# Patient Record
Sex: MALE
[De-identification: names, ages, dates, MRNs, and addresses within clinical notes are randomized per-mention and may not be internally consistent; named-entity substitution may affect disease eponyms.]

---

## 2018-06-11 ENCOUNTER — HOSPITAL ENCOUNTER (OUTPATIENT)
Dept: HOSPITAL 93 - RAD 501 | Age: 78
Discharge: HOME | End: 2018-06-11
Attending: UROLOGY
Payer: COMMERCIAL

## 2018-06-11 DIAGNOSIS — N20.1: Primary | ICD-10-CM

## 2018-06-11 DIAGNOSIS — R06.02: ICD-10-CM

## 2018-06-22 ENCOUNTER — HOSPITAL ENCOUNTER (INPATIENT)
Dept: HOSPITAL 93 - O/R | Age: 78
LOS: 5 days | Discharge: HOME | DRG: 714 | End: 2018-06-27
Attending: UROLOGY | Admitting: UROLOGY
Payer: COMMERCIAL

## 2018-06-22 VITALS — WEIGHT: 210 LBS | BODY MASS INDEX: 32.96 KG/M2 | HEIGHT: 67 IN

## 2018-06-22 DIAGNOSIS — N21.0: ICD-10-CM

## 2018-06-22 DIAGNOSIS — N40.1: Primary | ICD-10-CM

## 2018-06-25 PROCEDURE — 0TFB4ZZ FRAGMENTATION IN BLADDER, PERCUTANEOUS ENDOSCOPIC APPROACH: ICD-10-PCS | Performed by: UROLOGY

## 2018-06-25 PROCEDURE — 0VT08ZZ RESECTION OF PROSTATE, VIA NATURAL OR ARTIFICIAL OPENING ENDOSCOPIC: ICD-10-PCS | Performed by: UROLOGY

## 2018-09-18 ENCOUNTER — HOSPITAL ENCOUNTER (EMERGENCY)
Dept: HOSPITAL 93 - ER | Age: 78
Discharge: HOME | End: 2018-09-18
Payer: COMMERCIAL

## 2018-09-18 VITALS — HEIGHT: 67 IN | BODY MASS INDEX: 33.74 KG/M2 | WEIGHT: 215 LBS

## 2018-09-18 DIAGNOSIS — R06.02: Primary | ICD-10-CM

## 2018-09-18 DIAGNOSIS — F41.8: ICD-10-CM

## 2019-11-15 ENCOUNTER — HOSPITAL ENCOUNTER (OUTPATIENT)
Dept: HOSPITAL 93 - LAB | Age: 79
Discharge: HOME | End: 2019-11-15
Attending: UROLOGY
Payer: COMMERCIAL

## 2019-11-15 ENCOUNTER — HOSPITAL ENCOUNTER (OUTPATIENT)
Dept: HOSPITAL 93 - TOM | Age: 79
Discharge: HOME | End: 2019-11-15
Attending: UROLOGY
Payer: COMMERCIAL

## 2019-11-15 DIAGNOSIS — N21.0: ICD-10-CM

## 2019-11-15 DIAGNOSIS — R31.0: ICD-10-CM

## 2019-11-15 DIAGNOSIS — R97.21: Primary | ICD-10-CM

## 2019-11-15 DIAGNOSIS — R31.0: Primary | ICD-10-CM

## 2019-11-15 DIAGNOSIS — N20.1: ICD-10-CM

## 2019-11-15 DIAGNOSIS — N40.1: ICD-10-CM

## 2019-11-21 ENCOUNTER — HOSPITAL ENCOUNTER (INPATIENT)
Dept: HOSPITAL 93 - ER | Age: 79
LOS: 7 days | Discharge: HOME | DRG: 660 | End: 2019-11-28
Attending: INTERNAL MEDICINE | Admitting: INTERNAL MEDICINE
Payer: COMMERCIAL

## 2019-11-21 VITALS — WEIGHT: 215 LBS | BODY MASS INDEX: 33.74 KG/M2 | HEIGHT: 67 IN

## 2019-11-21 DIAGNOSIS — N21.0: ICD-10-CM

## 2019-11-21 DIAGNOSIS — I48.0: ICD-10-CM

## 2019-11-21 DIAGNOSIS — I07.1: ICD-10-CM

## 2019-11-21 DIAGNOSIS — R31.0: ICD-10-CM

## 2019-11-21 DIAGNOSIS — J32.0: ICD-10-CM

## 2019-11-21 DIAGNOSIS — J22: ICD-10-CM

## 2019-11-21 DIAGNOSIS — K57.30: ICD-10-CM

## 2019-11-21 DIAGNOSIS — N13.2: Primary | ICD-10-CM

## 2019-11-21 DIAGNOSIS — I31.3: ICD-10-CM

## 2019-11-21 DIAGNOSIS — N40.1: ICD-10-CM

## 2019-11-21 DIAGNOSIS — N39.0: ICD-10-CM

## 2019-11-21 DIAGNOSIS — N17.8: ICD-10-CM

## 2019-11-21 PROCEDURE — 3E0F7GC INTRODUCTION OF OTHER THERAPEUTIC SUBSTANCE INTO RESPIRATORY TRACT, VIA NATURAL OR ARTIFICIAL OPENING: ICD-10-PCS | Performed by: INTERNAL MEDICINE

## 2019-11-21 PROCEDURE — B246ZZZ ULTRASONOGRAPHY OF RIGHT AND LEFT HEART: ICD-10-PCS | Performed by: INTERNAL MEDICINE

## 2019-11-21 NOTE — NUR
PACIENTE ALERTA Y ORIENTADO POR GISELLA ESFERAS.  ORIENTA A PACIENTE
SOBRE PROCEDIMIENTO Y TX, REFIERE ENTENDER. EXTRAE MUESTRAS DE LABORATORIO CON
MEDIDAS ASEPTICAS Y COLOCA IVF'S BERT ORDEN MEDICA. PENDIENTE RESULTADOS DE
LABORATRIO PARA EVALUACION MEDICA.

## 2019-11-21 NOTE — NUR
SE RECIBE PTE MASCULINO, ALERTA Y ORIENTADO X3, REFIERE DOLOR EN TODO EL
CUERPO, COSTADO IZQ, ESPALDA BAJA Y HEMATURIA, TOS DESDE HACE DOS DAMARI. PTE
LLEGA AER POR ORDEN DE DR. RICKEY MURRIETA Y DR. WHELAN.

## 2019-11-27 PROCEDURE — 0TF78ZZ FRAGMENTATION IN LEFT URETER, VIA NATURAL OR ARTIFICIAL OPENING ENDOSCOPIC: ICD-10-PCS | Performed by: UROLOGY

## 2019-11-27 PROCEDURE — 0T778DZ DILATION OF LEFT URETER WITH INTRALUMINAL DEVICE, VIA NATURAL OR ARTIFICIAL OPENING ENDOSCOPIC: ICD-10-PCS | Performed by: UROLOGY

## 2019-11-27 PROCEDURE — BT1FZZZ FLUOROSCOPY OF LEFT KIDNEY, URETER AND BLADDER: ICD-10-PCS | Performed by: UROLOGY

## 2019-11-27 PROCEDURE — 0TF48ZZ FRAGMENTATION IN LEFT KIDNEY PELVIS, VIA NATURAL OR ARTIFICIAL OPENING ENDOSCOPIC: ICD-10-PCS | Performed by: UROLOGY

## 2020-02-13 ENCOUNTER — HOSPITAL ENCOUNTER (OUTPATIENT)
Dept: HOSPITAL 93 - LAB | Age: 80
Discharge: HOME | End: 2020-02-13
Attending: UROLOGY
Payer: COMMERCIAL

## 2020-02-13 ENCOUNTER — HOSPITAL ENCOUNTER (OUTPATIENT)
Dept: HOSPITAL 93 - RAD | Age: 80
Discharge: HOME | End: 2020-02-13
Attending: UROLOGY
Payer: COMMERCIAL

## 2020-02-13 DIAGNOSIS — N30.00: Primary | ICD-10-CM

## 2020-02-13 DIAGNOSIS — N20.1: Primary | ICD-10-CM
